# Patient Record
Sex: FEMALE | Race: OTHER
[De-identification: names, ages, dates, MRNs, and addresses within clinical notes are randomized per-mention and may not be internally consistent; named-entity substitution may affect disease eponyms.]

---

## 2020-11-27 ENCOUNTER — HOSPITAL ENCOUNTER (EMERGENCY)
Dept: HOSPITAL 46 - ED | Age: 35
Discharge: HOME | End: 2020-11-27
Payer: COMMERCIAL

## 2020-11-27 VITALS — HEIGHT: 63 IN | WEIGHT: 198 LBS | BODY MASS INDEX: 35.08 KG/M2
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
## 2020-11-27 DIAGNOSIS — F15.10: ICD-10-CM

## 2020-11-27 DIAGNOSIS — S51.831A: Primary | ICD-10-CM

## 2020-11-27 DIAGNOSIS — F17.200: ICD-10-CM

## 2022-07-03 ENCOUNTER — HOSPITAL ENCOUNTER (EMERGENCY)
Dept: HOSPITAL 46 - ED | Age: 37
Discharge: HOME | End: 2022-07-03
Payer: COMMERCIAL

## 2022-07-03 VITALS — HEIGHT: 63 IN | WEIGHT: 188.27 LBS | BODY MASS INDEX: 33.36 KG/M2
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
## 2022-07-03 DIAGNOSIS — S42.294A: Primary | ICD-10-CM

## 2022-07-03 DIAGNOSIS — F17.200: ICD-10-CM

## 2022-07-03 DIAGNOSIS — S20.219A: ICD-10-CM
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
## 2023-03-07 ENCOUNTER — HOSPITAL ENCOUNTER (EMERGENCY)
Dept: HOSPITAL 46 - ED | Age: 38
LOS: 1 days | Discharge: LEFT BEFORE DISCHARGE | End: 2023-03-08
Payer: COMMERCIAL

## 2023-03-07 VITALS — BODY MASS INDEX: 30.12 KG/M2 | WEIGHT: 170 LBS | HEIGHT: 63 IN

## 2023-03-07 DIAGNOSIS — F15.951: Primary | ICD-10-CM

## 2023-03-07 DIAGNOSIS — F17.200: ICD-10-CM

## 2023-03-07 DIAGNOSIS — Z79.899: ICD-10-CM

## 2023-03-07 DIAGNOSIS — F43.10: ICD-10-CM

## 2023-03-07 PROCEDURE — G0480 DRUG TEST DEF 1-7 CLASSES: HCPCS

## 2023-03-07 PROCEDURE — U0003 INFECTIOUS AGENT DETECTION BY NUCLEIC ACID (DNA OR RNA); SEVERE ACUTE RESPIRATORY SYNDROME CORONAVIRUS 2 (SARS-COV-2) (CORONAVIRUS DISEASE [COVID-19]), AMPLIFIED PROBE TECHNIQUE, MAKING USE OF HIGH THROUGHPUT TECHNOLOGIES AS DESCRIBED BY CMS-2020-01-R: HCPCS
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
## 2023-05-02 ENCOUNTER — HOSPITAL ENCOUNTER (EMERGENCY)
Dept: HOSPITAL 46 - ED | Age: 38
End: 2023-05-02
Payer: COMMERCIAL

## 2023-05-02 VITALS — DIASTOLIC BLOOD PRESSURE: 86 MMHG | SYSTOLIC BLOOD PRESSURE: 130 MMHG

## 2023-05-02 DIAGNOSIS — F17.200: ICD-10-CM

## 2023-05-02 DIAGNOSIS — F15.959: Primary | ICD-10-CM

## 2023-05-02 PROCEDURE — G0480 DRUG TEST DEF 1-7 CLASSES: HCPCS
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
